# Patient Record
Sex: FEMALE | Race: WHITE
[De-identification: names, ages, dates, MRNs, and addresses within clinical notes are randomized per-mention and may not be internally consistent; named-entity substitution may affect disease eponyms.]

---

## 2018-02-02 ENCOUNTER — HOSPITAL ENCOUNTER (OUTPATIENT)
Dept: HOSPITAL 47 - RADMAMWWP | Age: 58
LOS: 6 days | Discharge: HOME | End: 2018-02-08
Attending: FAMILY MEDICINE
Payer: COMMERCIAL

## 2021-12-09 ENCOUNTER — HOSPITAL ENCOUNTER (EMERGENCY)
Dept: HOSPITAL 47 - EC | Age: 61
Discharge: HOME | End: 2021-12-09
Payer: COMMERCIAL

## 2021-12-09 VITALS
SYSTOLIC BLOOD PRESSURE: 121 MMHG | DIASTOLIC BLOOD PRESSURE: 74 MMHG | RESPIRATION RATE: 18 BRPM | HEART RATE: 74 BPM | TEMPERATURE: 97.8 F

## 2021-12-09 DIAGNOSIS — J12.82: ICD-10-CM

## 2021-12-09 DIAGNOSIS — R55: ICD-10-CM

## 2021-12-09 DIAGNOSIS — I10: ICD-10-CM

## 2021-12-09 DIAGNOSIS — U07.1: Primary | ICD-10-CM

## 2021-12-09 DIAGNOSIS — D72.829: ICD-10-CM

## 2021-12-09 DIAGNOSIS — Z79.899: ICD-10-CM

## 2021-12-09 LAB
ALBUMIN SERPL-MCNC: 3.8 G/DL (ref 3.5–5)
ALP SERPL-CCNC: 107 U/L (ref 38–126)
ALT SERPL-CCNC: 28 U/L (ref 4–34)
ANION GAP SERPL CALC-SCNC: 10 MMOL/L
AST SERPL-CCNC: 36 U/L (ref 14–36)
BASOPHILS # BLD AUTO: 0 K/UL (ref 0–0.2)
BASOPHILS NFR BLD AUTO: 0 %
BUN SERPL-SCNC: 21 MG/DL (ref 7–17)
CALCIUM SPEC-MCNC: 9.2 MG/DL (ref 8.4–10.2)
CHLORIDE SERPL-SCNC: 102 MMOL/L (ref 98–107)
CK SERPL-CCNC: 103 U/L (ref 30–135)
CO2 SERPL-SCNC: 25 MMOL/L (ref 22–30)
EOSINOPHIL # BLD AUTO: 0 K/UL (ref 0–0.7)
EOSINOPHIL NFR BLD AUTO: 0 %
ERYTHROCYTE [DISTWIDTH] IN BLOOD BY AUTOMATED COUNT: 5.29 M/UL (ref 3.8–5.4)
ERYTHROCYTE [DISTWIDTH] IN BLOOD: 12.3 % (ref 11.5–15.5)
GLUCOSE SERPL-MCNC: 122 MG/DL (ref 74–99)
HCT VFR BLD AUTO: 47.7 % (ref 34–46)
HGB BLD-MCNC: 15.4 GM/DL (ref 11.4–16)
LYMPHOCYTES # SPEC AUTO: 1.5 K/UL (ref 1–4.8)
LYMPHOCYTES NFR SPEC AUTO: 14 %
MAGNESIUM SPEC-SCNC: 1.8 MG/DL (ref 1.6–2.3)
MCH RBC QN AUTO: 29.1 PG (ref 25–35)
MCHC RBC AUTO-ENTMCNC: 32.3 G/DL (ref 31–37)
MCV RBC AUTO: 90.1 FL (ref 80–100)
MONOCYTES # BLD AUTO: 0.7 K/UL (ref 0–1)
MONOCYTES NFR BLD AUTO: 6 %
NEUTROPHILS # BLD AUTO: 8.7 K/UL (ref 1.3–7.7)
NEUTROPHILS NFR BLD AUTO: 79 %
PLATELET # BLD AUTO: 238 K/UL (ref 150–450)
POTASSIUM SERPL-SCNC: 4 MMOL/L (ref 3.5–5.1)
PROT SERPL-MCNC: 6.5 G/DL (ref 6.3–8.2)
SODIUM SERPL-SCNC: 137 MMOL/L (ref 137–145)
WBC # BLD AUTO: 11 K/UL (ref 3.8–10.6)

## 2021-12-09 PROCEDURE — 36415 COLL VENOUS BLD VENIPUNCTURE: CPT

## 2021-12-09 PROCEDURE — 85025 COMPLETE CBC W/AUTO DIFF WBC: CPT

## 2021-12-09 PROCEDURE — 99285 EMERGENCY DEPT VISIT HI MDM: CPT

## 2021-12-09 PROCEDURE — 84484 ASSAY OF TROPONIN QUANT: CPT

## 2021-12-09 PROCEDURE — 80053 COMPREHEN METABOLIC PANEL: CPT

## 2021-12-09 PROCEDURE — 84443 ASSAY THYROID STIM HORMONE: CPT

## 2021-12-09 PROCEDURE — 82550 ASSAY OF CK (CPK): CPT

## 2021-12-09 PROCEDURE — 93005 ELECTROCARDIOGRAM TRACING: CPT

## 2021-12-09 PROCEDURE — 71046 X-RAY EXAM CHEST 2 VIEWS: CPT

## 2021-12-09 PROCEDURE — 83735 ASSAY OF MAGNESIUM: CPT

## 2021-12-09 NOTE — XR
EXAMINATION TYPE: XR chest 2V

 

DATE OF EXAM: 12/9/2021

 

COMPARISON: NONE

 

HISTORY: Syncope, Covid symptoms

 

TECHNIQUE:  Frontal and lateral views of the chest are obtained.

 

FINDINGS:  Suspect there is some patchy density in the right mid and lower lung. There is overlying a
rtifact. No evident pneumothorax or pleural effusion. Cardiac mediastinal silhouette within normal li
mits accounting for technique. Bones are remarkable for arthropathy in the acromioclavicular joints.

 

IMPRESSION:  Correlate for pneumonia. Follow-up suggested.

## 2021-12-09 NOTE — ED
Dizziness HPI





- General


Chief Complaint: Syncope


Stated Complaint: covid+/syncope


Time Seen by Provider: 12/09/21 10:04


Source: patient, EMS, RN notes reviewed


Mode of arrival: EMS


Limitations: no limitations





- History of Present Illness


Initial Comments: 





62-year-old female was diagnosed with Covid 19 3 days ago with symptoms starting

2 days prior who had just had an IV started for an injection of cold antibodies 

when she almost passed out she developed bradycardia hypotension no overt chest 

pain she states she's had symptoms of cough no fevers chills or sweats however. 

She has of a history of asthma she states.  She also states she has some chest 

tightness earlier with the Covid.  She recovered to much improved during 

transport.


MD Complaint: dizziness, lightheadedness, near syncope





- Related Data


                                Home Medications











 Medication  Instructions  Recorded  Confirmed


 


Albuterol Sulfate [Ventolin HFA] 2 puff INHALATION RT-Q4H PRN 12/09/21 12/09/21


 


Cefdinir [Omnicef] 300 mg PO BID 12/09/21 12/09/21


 


Fluticasone/Umeclidin/Vilanter 1 puff INHALATION RT-DAILY 12/09/21 12/09/21





[Trelegy Ellipta 200-62.5-25]   


 


Lisinopril [Zestril] 10 mg PO DAILY 12/09/21 12/09/21


 


predniSONE See Taper PO DAILY 12/09/21 12/09/21








                                  Previous Rx's











 Medication  Instructions  Recorded


 


Azithromycin [Zithromax Z-pack (6 250 mg PO AS DIRECTED 5 Days #6 tab 12/09/21





tabs)]  











                                    Allergies











Allergy/AdvReac Type Severity Reaction Status Date / Time


 


Penicillins Allergy  Rash/Hives Verified 12/09/21 10:47














Review of Systems


ROS Statement: 


Those systems with pertinent positive or pertinent negative responses have been 

documented in the HPI.





ROS Other: All systems not noted in ROS Statement are negative.





Past Medical History


Past Medical History: Hypertension


History of Any Multi-Drug Resistant Organisms: None Reported


Past Surgical History: Hysterectomy, Orthopedic Surgery


Past Psychological History: No Psychological Hx Reported


Smoking Status: Never smoker


Past Alcohol Use History: Occasional


Past Drug Use History: None Reported





General Exam





- General Exam Comments


Initial Comments: 





This a well-developed well-nourished awake alert oriented 3 female


Limitations: no limitations


General appearance: alert, in no apparent distress


Head exam: Present: atraumatic, normocephalic, normal inspection


Eye exam: Present: normal appearance, PERRL, EOMI.  Absent: scleral icterus, 

conjunctival injection, periorbital swelling


ENT exam: Present: normal exam, mucous membranes moist


Neck exam: Present: normal inspection.  Absent: tenderness, meningismus, 

lymphadenopathy


Respiratory exam: Present: decreased breath sounds.  Absent: respiratory 

distress, wheezes, rales, rhonchi, stridor


Cardiovascular Exam: Present: regular rate, normal rhythm, normal heart sounds. 

Absent: systolic murmur, diastolic murmur, rubs, gallop, clicks


GI/Abdominal exam: Present: soft, normal bowel sounds.  Absent: distended, 

tenderness, guarding, rebound, rigid


Extremities exam: Present: normal inspection, full ROM, normal capillary refill.

 Absent: tenderness, pedal edema, joint swelling, calf tenderness


Back exam: Present: normal inspection


Neurological exam: Present: alert, oriented X3, CN II-XII intact


Psychiatric exam: Present: normal affect, normal mood


Skin exam: Present: warm, dry, intact, normal color.  Absent: rash





Course


                                   Vital Signs











  12/09/21





  10:08


 


Temperature 99 F


 


Pulse Rate 85


 


Respiratory 20





Rate 


 


Blood Pressure 120/64


 


O2 Sat by Pulse 95





Oximetry 














EKG Findings





- EKG Results:


EKG: interpreted by SOFIYA TELLO, sinus rhythm, normal axis, normal QRS, normal 

ST/T, no acute changes (Sinus rhythm a 75.  Interval 156 QRS 74 QT since QTC 

372/413 no acute ST-T wave changes)





Medical Decision Making





- Medical Decision Making





Evaluation patient finds that she feels much improved this time no further 

symptoms.  She does have evidence of bilateral infiltrates also has elevated 

white blood cell count with the left shift.  We did discuss all the findings.  

Patient will be discharged on antibiotics in addition to the other medications 

for COVID-19 she does have inhalers at home.





- Lab Data


Result diagrams: 


                                 12/09/21 10:31





                                 12/09/21 10:31


                                   Lab Results











  12/09/21 12/09/21 12/09/21 Range/Units





  10:31 10:31 10:31 


 


WBC  11.0 H    (3.8-10.6)  k/uL


 


RBC  5.29    (3.80-5.40)  m/uL


 


Hgb  15.4    (11.4-16.0)  gm/dL


 


Hct  47.7 H    (34.0-46.0)  %


 


MCV  90.1    (80.0-100.0)  fL


 


MCH  29.1    (25.0-35.0)  pg


 


MCHC  32.3    (31.0-37.0)  g/dL


 


RDW  12.3    (11.5-15.5)  %


 


Plt Count  238    (150-450)  k/uL


 


MPV  7.9    


 


Neutrophils %  79    %


 


Lymphocytes %  14    %


 


Monocytes %  6    %


 


Eosinophils %  0    %


 


Basophils %  0    %


 


Neutrophils #  8.7 H    (1.3-7.7)  k/uL


 


Lymphocytes #  1.5    (1.0-4.8)  k/uL


 


Monocytes #  0.7    (0-1.0)  k/uL


 


Eosinophils #  0.0    (0-0.7)  k/uL


 


Basophils #  0.0    (0-0.2)  k/uL


 


Sodium   137   (137-145)  mmol/L


 


Potassium   4.0   (3.5-5.1)  mmol/L


 


Chloride   102   ()  mmol/L


 


Carbon Dioxide   25   (22-30)  mmol/L


 


Anion Gap   10   mmol/L


 


BUN   21 H   (7-17)  mg/dL


 


Creatinine   0.91   (0.52-1.04)  mg/dL


 


Est GFR (CKD-EPI)AfAm   78   (>60 ml/min/1.73 sqM)  


 


Est GFR (CKD-EPI)NonAf   68   (>60 ml/min/1.73 sqM)  


 


Glucose   122 H   (74-99)  mg/dL


 


Calcium   9.2   (8.4-10.2)  mg/dL


 


Magnesium   1.8   (1.6-2.3)  mg/dL


 


Total Bilirubin   0.8   (0.2-1.3)  mg/dL


 


AST   36   (14-36)  U/L


 


ALT   28   (4-34)  U/L


 


Alkaline Phosphatase   107   ()  U/L


 


Creatine Kinase   103   ()  U/L


 


Troponin I    <0.012  (0.000-0.034)  ng/mL


 


Total Protein   6.5   (6.3-8.2)  g/dL


 


Albumin   3.8   (3.5-5.0)  g/dL


 


TSH   2.280   (0.465-4.680)  mIU/L














- Radiology Data


Radiology results: report reviewed (Image reviewed evidence of bilateral lower 

lobe infiltrates consistent with pneumonia), image reviewed





Disposition


Clinical Impression: 


 Pneumonia due to COVID-19 virus, Vasovagal episode, Near syncope, Viral 

syndrome, Leukocytosis, Fever





Disposition: HOME SELF-CARE


Condition: Good


Instructions (If sedation given, give patient instructions):  Coronavirus 

Disease 2019 (COVID-19), Viral Pneumonia (ED), Leukocytosis (ED), Fever in 

Adults (ED)


Prescriptions: 


Azithromycin [Zithromax Z-pack (6 tabs)] 250 mg PO AS DIRECTED 5 Days #6 tab


Is patient prescribed a controlled substance at d/c from ED?: No


Referrals: 


Jose Luis Cordova DO [Primary Care Provider] - 1-2 days